# Patient Record
Sex: MALE | URBAN - METROPOLITAN AREA
[De-identification: names, ages, dates, MRNs, and addresses within clinical notes are randomized per-mention and may not be internally consistent; named-entity substitution may affect disease eponyms.]

---

## 2020-12-21 ENCOUNTER — APPOINTMENT (RX ONLY)
Dept: URBAN - METROPOLITAN AREA CLINIC 55 | Facility: CLINIC | Age: 55
Setting detail: DERMATOLOGY
End: 2020-12-21

## 2020-12-21 DIAGNOSIS — Z41.9 ENCOUNTER FOR PROCEDURE FOR PURPOSES OTHER THAN REMEDYING HEALTH STATE, UNSPECIFIED: ICD-10-CM

## 2020-12-21 PROCEDURE — ? SUBCUTANEOUS HORMONE PELLET IMPLANTATION

## 2020-12-21 ASSESSMENT — LOCATION SIMPLE DESCRIPTION DERM: LOCATION SIMPLE: LEFT BUTTOCK

## 2020-12-21 ASSESSMENT — LOCATION DETAILED DESCRIPTION DERM: LOCATION DETAILED: LEFT BUTTOCK

## 2020-12-21 ASSESSMENT — LOCATION ZONE DERM: LOCATION ZONE: TRUNK

## 2020-12-21 NOTE — PROCEDURE: SUBCUTANEOUS HORMONE PELLET IMPLANTATION
Incision And Trocar Text: After ensuring adequate anesthesia, a 4mm stab incision was made. A 3.5mm trocar was introduced through the incision into the subcutaneous fat coursing horizontally toward the hip. The stylus was then removed.
Price (Use Numbers Only, No Special Characters Or $): 650.00
Pellet Placement Text (Pellets Of Testosterone.....Xxx): were then placed into the trocar and slid into place under the skin, approximately 1.5 inches from the incision without difficulty. The trocar was then withdrawn and the wound was closed with steri-strips and a dressing was applied. The patient applied pressure to the wound for 4 minutes. There was less than 1cc of blood loss during the case. The patient was given a post-op instruction sheet and has been instructed to call us if experiencing any problems.
Anesthesia Type: 1% lidocaine with epinephrine and a 1:10 solution of 8.4% sodium bicarbonate
Pre-Operative Text: The patient was placed in a lateral recumbent position. The skin 2-3 inches below the waistline in the upper outer quadrant of the buttocks was cleansed with alcohol. See diagram.
Number Of Testosterone Pellets: 8
Testosterone Lot Number (Optional): [id number]
Hide Expiration Date: No
Anesthesia Text (1% Lidocaine With Epinephrine (1.5cc)......): was injected subdermally 2 inches in a horizontal fashion to achieve local anesthesia.
Anesthesia Volume In Cc: 11
Post-Care Instructions: I reviewed with the patient in detail post-care instructions. Patient understands to call the clinic if there is any redness, swelling or pain. A detailed post-insertion hand was reviewed and provided to the patient.
Testosterone Expiration Date (Optional): 10/21/2021
Detail Level: Detailed
Testosterone Mg (Optional): 1600